# Patient Record
Sex: FEMALE | Race: OTHER | NOT HISPANIC OR LATINO | URBAN - METROPOLITAN AREA
[De-identification: names, ages, dates, MRNs, and addresses within clinical notes are randomized per-mention and may not be internally consistent; named-entity substitution may affect disease eponyms.]

---

## 2022-02-04 ENCOUNTER — EMERGENCY (EMERGENCY)
Facility: HOSPITAL | Age: 62
LOS: 1 days | Discharge: HOME | End: 2022-02-04
Attending: STUDENT IN AN ORGANIZED HEALTH CARE EDUCATION/TRAINING PROGRAM | Admitting: STUDENT IN AN ORGANIZED HEALTH CARE EDUCATION/TRAINING PROGRAM
Payer: MEDICAID

## 2022-02-04 VITALS
TEMPERATURE: 97 F | DIASTOLIC BLOOD PRESSURE: 91 MMHG | RESPIRATION RATE: 16 BRPM | OXYGEN SATURATION: 100 % | HEART RATE: 98 BPM | SYSTOLIC BLOOD PRESSURE: 153 MMHG

## 2022-02-04 PROCEDURE — 99284 EMERGENCY DEPT VISIT MOD MDM: CPT

## 2022-02-04 NOTE — ED ADULT TRIAGE NOTE - CHIEF COMPLAINT QUOTE
Pt ambulatory s/p slipped and fell on ice on Wednesday night. Reports fell forward and hit head. Pt c/o h/a, L. knee pain, L. ankle pain, and L. hand 4th digit pain. Denies LOC, denies blood thinner use. Denies n/v, dizziness. PMHx diabetes, htn

## 2022-02-05 VITALS
DIASTOLIC BLOOD PRESSURE: 84 MMHG | SYSTOLIC BLOOD PRESSURE: 156 MMHG | HEART RATE: 92 BPM | RESPIRATION RATE: 18 BRPM | OXYGEN SATURATION: 100 % | TEMPERATURE: 98 F

## 2022-02-05 PROCEDURE — 73600 X-RAY EXAM OF ANKLE: CPT | Mod: 26,LT

## 2022-02-05 PROCEDURE — 73110 X-RAY EXAM OF WRIST: CPT | Mod: 26,LT

## 2022-02-05 PROCEDURE — 73564 X-RAY EXAM KNEE 4 OR MORE: CPT | Mod: 26,LT

## 2022-02-05 PROCEDURE — 73120 X-RAY EXAM OF HAND: CPT | Mod: 26,LT

## 2022-02-05 PROCEDURE — 73590 X-RAY EXAM OF LOWER LEG: CPT | Mod: 26,LT

## 2022-02-05 RX ORDER — KETOROLAC TROMETHAMINE 30 MG/ML
15 SYRINGE (ML) INJECTION ONCE
Refills: 0 | Status: DISCONTINUED | OUTPATIENT
Start: 2022-02-05 | End: 2022-02-05

## 2022-02-05 RX ORDER — ACETAMINOPHEN 500 MG
975 TABLET ORAL ONCE
Refills: 0 | Status: COMPLETED | OUTPATIENT
Start: 2022-02-05 | End: 2022-02-05

## 2022-02-05 RX ADMIN — Medication 15 MILLIGRAM(S): at 01:48

## 2022-02-05 RX ADMIN — Medication 975 MILLIGRAM(S): at 01:48

## 2022-02-05 NOTE — ED PROVIDER NOTE - CLINICAL SUMMARY MEDICAL DECISION MAKING FREE TEXT BOX
62 yo F hx of htn dm presents s/p fall with l knee, ankle, l hand pain. vss, l ring finger dip in extension and tenderness, l knee tender, l ankle medially tender. will image, low suspicion for fractures. all neurovascularly intact. l ring finger concern for extensor rupture/mallet finger. will splint. pain meds and reassess.

## 2022-02-05 NOTE — ED PROVIDER NOTE - PHYSICAL EXAMINATION
GENERAL: no acute distress, non-toxic appearing  HEAD: normocephalic, atraumatic  HEENT: normal conjunctiva, oral mucosa moist, neck supple  CARDIAC: regular rate and rhythm, normal S1 and S2,  no appreciable murmurs  PULM: clear to ascultation bilaterally, no crackles, rales, rhonchi, or wheezing  GI: abdomen nondistended, soft, nontender, no guarding or rebound tenderness  NEURO: alert and oriented x 3, normal speech, moving all extremities  MSK:   +L hand: ring finger DIP jt held in extension, pain with extension, no tenderness over other digits, wrist, no snuffbox tenderness. Neurovasc intact, otherwise full rom  +L knee: tenderness over patella, no deformities, no patellar dislocations, rom limited by pain  +L ankle: no deformities, ttp medial malleolus, less over lateral malleolus, no plantar or dorsum tenderness, neuro vasc intact, rom full  SKIN: no visible rashes, dry, well-perfused  PSYCH: appropriate mood and affect

## 2022-02-05 NOTE — ED PROVIDER NOTE - NSFOLLOWUPINSTRUCTIONS_ED_ALL_ED_FT
You were seen in the Emergency Department after a fall. You have an extensor tendinopathy You were seen in the Emergency Department after a fall. You have an extensor tendinopathy which requires your finger to be splinted in an extended position for about 4-6 weeks. You should follow up with hand surgery in 1 week (number below). For pain take Tylenol 650 mg and Motrin 600 mg every 6 hours. Return to the ED if you have numbness, tingling, weakness, increasing pain.       Mallet Finger       Mallet finger is an injury that occurs when an object hits the tip of your straightened finger or thumb. It is also known as baseball finger. The blow to your fingertip causes it to bend more than normal, which tears the cord that attaches to the tip of your finger (extensor tendon).     Your extensor tendon is what straightens the end of your finger. If this tendon is damaged, you will not be able to straighten your fingertip. Sometimes, a piece of bone may be pulled away with the tendon (avulsion injury), or the tendon may tear completely. In some cases, surgery may be required to repair the damage.      What are the causes?    Mallet finger is caused by a hard, direct hit to the tip of your finger or thumb. This injury often happens from getting hit in the finger with a hard ball, such as a baseball.    What increases the risk?    This injury is more likely to happen if you play a sport that uses a hard ball.    What are the signs or symptoms?    The main symptom of this injury is the inability to straighten the tip of your finger. You can manually straighten your fingertip with your other hand, but the finger cannot straighten on its own.  Other symptoms may include:  •Pain.  •Swelling.  •Bruising.  •Blood under the fingernail.        How is this diagnosed?    Your health care provider may suspect mallet finger if you are not able to extend your fingertip, especially if you recently injured your hand. Your health care provider will do a physical exam. This may include X-rays to see if a piece of bone has been pulled away or if the finger joint has  (dislocated).      How is this treated?  Mallet finger may be treated with:  •A splint on your fingertip to keep it straight (extended) while the tendon heals.  •Surgery to repair the tendon. This is done in severe cases. This may involve:  •Using a pin or screw to keep your finger extended and your tendon attached.  •Using a piece of tendon from another part of your body (graft) to replace a torn tendon.          Follow these instructions at home:    If you have a splint:   •Wear the splint as told by your health care provider. Remove it only as told by your health care provider.  •Loosen the splint if your fingers tingle, become numb, or turn cold and blue.  •Keep the splint clean.    •If the splint is not waterproof:  •Do not let it get wet.  •Cover it with a watertight covering when you take a bath or a shower.  •If you take your splint off to dry it or change it:  •Gently press your finger on a flat surface to keep it straight. Failing to do so may lead to a permanent injury, or force you to wear the splint for a longer period of time.  •Check the skin under the splint. Tell your health care provider if you notice a blister or red and raw skin.          Managing pain, stiffness, and swelling    •If directed, put ice on the injured area:  •If you have a removable splint, remove it as told by your health care provider.  •Put ice in a plastic bag.  •Place a towel between your skin and the bag.  •Leave the ice on for 20 minutes, 2–3 times a day.  •Move your fingers often to avoid stiffness and to lessen swelling.  •Raise (elevate)the injured hand above the level of your heart while you are sitting or lying down.      General instructions     •Take over-the-counter and prescription medicines only as told by your health care provider.  • Do not drive or use heavy machinery while taking prescription pain medicine.  •Keep all follow-up visits as told by your health care provider. This is important.    Contact a health care provider if:    •You have pain or swelling that is getting worse.  •Your finger feels cold.  •You cannot extend your finger after treatment.  •You notice that the skin under the splint is red, raw, or has a blister.    Get help right away if:  •Even after loosening your splint, your finger is:  •Very red and swollen.  •White or blue.  •Numb or tingling.    Summary    •Mallet finger is an injury that occurs from a hard, direct hit to the tip of your finger or thumb.  •The blow to your fingertip causes it to bend more than normal, tearing the tendon that straightens the end of your finger. You cannot straighten your fingertip if this tendon is torn  •This injury often happens from getting hit in the finger with a hard ball, such as a baseball.  •Treatment will depend on how severe the injury is. You may need to wear a splint to keep the finger straight while it heals. A more severe injury may require surgery to repair the tendon. You were seen in the Emergency Department after a fall. You have an extensor tendinopathy which requires your finger to be splinted in an extended position for about 4-6 weeks. You should follow up with hand surgery in 1 week (number below). For pain take Tylenol 650 mg and Motrin 600 mg every 6 hours. Return to the ED if you have numbness, tingling, weakness, increasing pain.     [id number] (Hand Clinic)      Mallet Finger       Mallet finger is an injury that occurs when an object hits the tip of your straightened finger or thumb. It is also known as baseball finger. The blow to your fingertip causes it to bend more than normal, which tears the cord that attaches to the tip of your finger (extensor tendon).     Your extensor tendon is what straightens the end of your finger. If this tendon is damaged, you will not be able to straighten your fingertip. Sometimes, a piece of bone may be pulled away with the tendon (avulsion injury), or the tendon may tear completely. In some cases, surgery may be required to repair the damage.      What are the causes?    Mallet finger is caused by a hard, direct hit to the tip of your finger or thumb. This injury often happens from getting hit in the finger with a hard ball, such as a baseball.    What increases the risk?    This injury is more likely to happen if you play a sport that uses a hard ball.    What are the signs or symptoms?    The main symptom of this injury is the inability to straighten the tip of your finger. You can manually straighten your fingertip with your other hand, but the finger cannot straighten on its own.  Other symptoms may include:  •Pain.  •Swelling.  •Bruising.  •Blood under the fingernail.        How is this diagnosed?    Your health care provider may suspect mallet finger if you are not able to extend your fingertip, especially if you recently injured your hand. Your health care provider will do a physical exam. This may include X-rays to see if a piece of bone has been pulled away or if the finger joint has  (dislocated).      How is this treated?  Mallet finger may be treated with:  •A splint on your fingertip to keep it straight (extended) while the tendon heals.  •Surgery to repair the tendon. This is done in severe cases. This may involve:  •Using a pin or screw to keep your finger extended and your tendon attached.  •Using a piece of tendon from another part of your body (graft) to replace a torn tendon.          Follow these instructions at home:    If you have a splint:   •Wear the splint as told by your health care provider. Remove it only as told by your health care provider.  •Loosen the splint if your fingers tingle, become numb, or turn cold and blue.  •Keep the splint clean.    •If the splint is not waterproof:  •Do not let it get wet.  •Cover it with a watertight covering when you take a bath or a shower.  •If you take your splint off to dry it or change it:  •Gently press your finger on a flat surface to keep it straight. Failing to do so may lead to a permanent injury, or force you to wear the splint for a longer period of time.  •Check the skin under the splint. Tell your health care provider if you notice a blister or red and raw skin.          Managing pain, stiffness, and swelling    •If directed, put ice on the injured area:  •If you have a removable splint, remove it as told by your health care provider.  •Put ice in a plastic bag.  •Place a towel between your skin and the bag.  •Leave the ice on for 20 minutes, 2–3 times a day.  •Move your fingers often to avoid stiffness and to lessen swelling.  •Raise (elevate)the injured hand above the level of your heart while you are sitting or lying down.      General instructions     •Take over-the-counter and prescription medicines only as told by your health care provider.  • Do not drive or use heavy machinery while taking prescription pain medicine.  •Keep all follow-up visits as told by your health care provider. This is important.    Contact a health care provider if:    •You have pain or swelling that is getting worse.  •Your finger feels cold.  •You cannot extend your finger after treatment.  •You notice that the skin under the splint is red, raw, or has a blister.    Get help right away if:  •Even after loosening your splint, your finger is:  •Very red and swollen.  •White or blue.  •Numb or tingling.    Summary    •Mallet finger is an injury that occurs from a hard, direct hit to the tip of your finger or thumb.  •The blow to your fingertip causes it to bend more than normal, tearing the tendon that straightens the end of your finger. You cannot straighten your fingertip if this tendon is torn  •This injury often happens from getting hit in the finger with a hard ball, such as a baseball.  •Treatment will depend on how severe the injury is. You may need to wear a splint to keep the finger straight while it heals. A more severe injury may require surgery to repair the tendon.

## 2022-02-05 NOTE — ED ADULT NURSE NOTE - OBJECTIVE STATEMENT
Pt. is a 61 y.o female who presents to green#8A after sustaining a fall. Pt. A&Ox4. Pt. is a 61 y.o female who presents to green#8A after sustaining a fall. Pt. A&Ox4. Pmhx DM2, HTN. Pt. states she slipped and fell on ice on wednesday evening. States she hit her head. Denies LOC or blood thinner use. Endorsing pain to the Lt. knee, Lt. hand and lt. ankle. Endorsing HA. Pt. is a 61 y.o female who presents to green#8A after sustaining a fall. Pt. A&Ox4. Pmhx DM2, HTN. Pt. states she slipped and fell on ice on wednesday evening. States she hit her head. Denies LOC or blood thinner use. Endorsing pain to the Lt. knee, Lt. hand and lt. ankle. Endorsing HA. Rates pain 8/10. Did not take anything today for pain. ROM within normal limits but painful. Pt. not in any acute distress. Respirations equal and unlabored b/l. Comfort measures provided, safety measures implemented. Pending Ct.

## 2022-02-05 NOTE — ED PROVIDER NOTE - NS ED ROS FT
GENERAL: no fever, no chills, no weight loss  EYES: no change in vision, no irritation, no discharge, no redness, no pain  HEENT: no trouble swallowing or speaking, no throat pain, no ear pain  CARDIAC: no chest pain, no palpitations   PULMONARY: no cough, no shortness of breath, no wheezing  GI: no abdominal pain, no nausea, no vomiting, no diarrhea, no constipation, no melena, no hematochezia, no hematemesis  : no changes in urination, no dysuria, no frequency, no hematuria, no discharge  SKIN: no rashes  NEURO: no headache, no numbness, no weakness  MSK: +L knee pain, +L ring finger pain, +L ankle pain, no muscle pain, no back pain, no calf pain

## 2022-02-05 NOTE — ED PROVIDER NOTE - OBJECTIVE STATEMENT
60 yo hx of DM, HTN presents with s/p fall 2 days ago with L hand pain, L knee pain, L ankle pain. Patient slipped on ice, fell onto left side, unknown if she hit her head. No LOC, no blood thinners. No abrasions. Denies CP, SOB, nausea, vomiting, numbness, tingling.

## 2022-02-05 NOTE — ED ADULT NURSE NOTE - NSIMPLEMENTINTERV_GEN_ALL_ED
Implemented All Universal Safety Interventions:  [place] to call system. Call bell, personal items and telephone within reach. Instruct patient to call for assistance. Room bathroom lighting operational. Non-slip footwear when patient is off stretcher. Physically safe environment: no spills, clutter or unnecessary equipment. Stretcher in lowest position, wheels locked, appropriate side rails in place.

## 2022-02-05 NOTE — ED PROVIDER NOTE - PATIENT PORTAL LINK FT
You can access the FollowMyHealth Patient Portal offered by [facility] by registering at the following website: [email or web address]. By joining [facility]s FollowMyHealth portal, you will also be able to view your health information using other applications (apps) compatible with our system.

## 2022-02-05 NOTE — ED PROVIDER NOTE - ATTENDING CONTRIBUTION TO CARE
I have personally performed a face to face medical and diagnostic evaluation of the patient. I have discussed with and reviewed the Resident's note and agree with the History, ROS, Physical Exam and MDM unless otherwise indicated. A brief summary of my personal evaluation and impression can be found below.    60yo F hx htn dm not on AC s/p mechanical fall 2 days ago w/ L 4th digit pain, L knee pain, L ankle pain. Able ambulate and range all extremities. Unsure if head trauma but no LOC, just w/ mild generalized HA. No visoin changes, n/v, cp, sob, abck pain, focal weakness/numbness  VITALS: Initial triage and subsequent vitals have been reviewed by me.  Gen: Well appearing, NAD, alert, non-toxic  Head: NCAT  HEENT: PERRL, MMM, normal conjunctiva, anicteric, neck supple  Lung: CTAB, no adventitious sounds  CV: RRR, no murmurs, 2+symmetric peripheral pulses  Abd: soft, NTND, no rebound or guarding, no palpable masses  MSK: No edema, no visible deformities, L 4th DIP held in flexion w/ mild ttp, c/w mallet finger, rest of joints full ROM, mild ttp to patella, no knee laxity on ligamentous testing, mild L lateral mall ttp/swelling, no scaphoid ttp  Neuro: Following commands appropriately, fluid speech, CN II-XII grossly intact. 5/5 strength and normal sensation in all extremities. Ambulatory with stable gait.  Skin: Warm and dry, no evidence of rash  S/p fall several days ago low mehcanism well appearing w/ minimal signs of trauma. Ambulatory and reassuring knee/ankle exam low suspicion fx or significant ligamentous injury likely soft tissue injury will get xr r/o fx. Clinically w/ mallet finger/extensor tendor dysfunction will get xr and splint in slight hyperextension and give hand surgery fu  Psych: normal mood and affect